# Patient Record
Sex: MALE | Race: WHITE | Employment: FULL TIME | URBAN - METROPOLITAN AREA
[De-identification: names, ages, dates, MRNs, and addresses within clinical notes are randomized per-mention and may not be internally consistent; named-entity substitution may affect disease eponyms.]

---

## 2019-11-16 RX ORDER — LISINOPRIL 20 MG/1
20 TABLET ORAL NIGHTLY
COMMUNITY

## 2019-11-16 RX ORDER — CHOLECALCIFEROL (VITAMIN D3) 50 MCG
TABLET ORAL NIGHTLY
COMMUNITY

## 2019-11-16 RX ORDER — ALBUTEROL SULFATE 90 UG/1
1 AEROSOL, METERED RESPIRATORY (INHALATION) EVERY 6 HOURS PRN
COMMUNITY

## 2019-11-16 RX ORDER — TRAZODONE HYDROCHLORIDE 50 MG/1
50 TABLET ORAL NIGHTLY
COMMUNITY

## 2019-11-16 RX ORDER — FLUTICASONE PROPIONATE 50 MCG
1 SPRAY, SUSPENSION (ML) NASAL NIGHTLY
COMMUNITY

## 2019-11-16 RX ORDER — MELOXICAM 15 MG/1
15 TABLET ORAL NIGHTLY PRN
COMMUNITY

## 2019-11-16 RX ORDER — CYCLOBENZAPRINE HCL 10 MG
10 TABLET ORAL 3 TIMES DAILY PRN
COMMUNITY

## 2019-11-16 RX ORDER — MULTIVITAMIN
TABLET ORAL NIGHTLY
COMMUNITY

## 2019-11-16 RX ORDER — ZOLPIDEM TARTRATE 12.5 MG/1
12.5 TABLET, FILM COATED, EXTENDED RELEASE ORAL NIGHTLY PRN
COMMUNITY

## 2019-11-19 ENCOUNTER — ANESTHESIA EVENT (OUTPATIENT)
Dept: SURGERY | Facility: HOSPITAL | Age: 54
DRG: 454 | End: 2019-11-19
Payer: COMMERCIAL

## 2019-11-20 ENCOUNTER — ANESTHESIA (OUTPATIENT)
Dept: SURGERY | Facility: HOSPITAL | Age: 54
DRG: 454 | End: 2019-11-20
Payer: COMMERCIAL

## 2019-11-20 ENCOUNTER — HOSPITAL ENCOUNTER (INPATIENT)
Facility: HOSPITAL | Age: 54
LOS: 2 days | Discharge: HOME | DRG: 454 | End: 2019-11-22
Attending: NEUROLOGICAL SURGERY | Admitting: NEUROLOGICAL SURGERY
Payer: COMMERCIAL

## 2019-11-20 ENCOUNTER — APPOINTMENT (OUTPATIENT)
Dept: GENERAL RADIOLOGY | Facility: HOSPITAL | Age: 54
DRG: 454 | End: 2019-11-20
Attending: NEUROLOGICAL SURGERY
Payer: COMMERCIAL

## 2019-11-20 PROBLEM — M48.02 CERVICAL STENOSIS OF SPINAL CANAL: Status: ACTIVE | Noted: 2019-11-20

## 2019-11-20 PROCEDURE — 0PB30ZZ EXCISION OF CERVICAL VERTEBRA, OPEN APPROACH: ICD-10-PCS | Performed by: NEUROLOGICAL SURGERY

## 2019-11-20 PROCEDURE — 0RG20AJ FUSION OF 2 OR MORE CERVICAL VERTEBRAL JOINTS WITH INTERBODY FUSION DEVICE, POSTERIOR APPROACH, ANTERIOR COLUMN, OPEN APPROACH: ICD-10-PCS | Performed by: NEUROLOGICAL SURGERY

## 2019-11-20 PROCEDURE — 76000 FLUOROSCOPY <1 HR PHYS/QHP: CPT | Performed by: NEUROLOGICAL SURGERY

## 2019-11-20 PROCEDURE — 0RG2071 FUSION OF 2 OR MORE CERVICAL VERTEBRAL JOINTS WITH AUTOLOGOUS TISSUE SUBSTITUTE, POSTERIOR APPROACH, POSTERIOR COLUMN, OPEN APPROACH: ICD-10-PCS | Performed by: NEUROLOGICAL SURGERY

## 2019-11-20 DEVICE — OSTEOCEL PRO SMALL: Type: IMPLANTABLE DEVICE | Site: SPINE CERVICAL | Status: FUNCTIONAL

## 2019-11-20 DEVICE — THE ALLY BONE SCREW IS A SINGLE-USE IMPLANT MADE OF TITANIUM ALLOY AND USED IN BONE RECONSTRUCTION, OSTEOTOMY, ARTHRODESIS, JOIN FUSION, FRACTURE REPAIR AND FIXATION APPROPRIATE FOR THE SIZE OF THE DEVICE.
Type: IMPLANTABLE DEVICE | Site: SPINE CERVICAL | Status: FUNCTIONAL
Brand: ALLY BONE SCREW

## 2019-11-20 DEVICE — THE DTRAX CERVICAL CAGE-B IS A SINGLE-USE, TITANIUM ALLOY INTERVERTEBRAL IMPLANT AVAILABLE IN VARIOUS FOOTPRINTS AND HEIGHTS. IT IS INTENDED TO BE USED IN CERVICAL SPINAL FUSION SURGERY FOR SKELETALLY MATURE PATIENTS WITH DEGENERATIVE DISC DISEASE OF THE CERVICAL SPINE WITH ACCOMPANYING RADICULAR SYMPTOMS AT ONE DISC LEVEL.
Type: IMPLANTABLE DEVICE | Site: SPINE CERVICAL | Status: FUNCTIONAL
Brand: CAVUX  CERVICAL CAGE-B

## 2019-11-20 RX ORDER — MIDAZOLAM HYDROCHLORIDE 1 MG/ML
INJECTION INTRAMUSCULAR; INTRAVENOUS AS NEEDED
Status: DISCONTINUED | OUTPATIENT
Start: 2019-11-20 | End: 2019-11-20 | Stop reason: SURG

## 2019-11-20 RX ORDER — HYDROCODONE BITARTRATE AND ACETAMINOPHEN 5; 325 MG/1; MG/1
1 TABLET ORAL AS NEEDED
Status: DISCONTINUED | OUTPATIENT
Start: 2019-11-20 | End: 2019-11-20 | Stop reason: HOSPADM

## 2019-11-20 RX ORDER — HYDROMORPHONE HYDROCHLORIDE 1 MG/ML
0.2 INJECTION, SOLUTION INTRAMUSCULAR; INTRAVENOUS; SUBCUTANEOUS EVERY 5 MIN PRN
Status: DISCONTINUED | OUTPATIENT
Start: 2019-11-20 | End: 2019-11-20 | Stop reason: HOSPADM

## 2019-11-20 RX ORDER — FLUTICASONE PROPIONATE 50 MCG
1 SPRAY, SUSPENSION (ML) NASAL NIGHTLY
Status: DISCONTINUED | OUTPATIENT
Start: 2019-11-20 | End: 2019-11-22

## 2019-11-20 RX ORDER — DIPHENHYDRAMINE HCL 25 MG
25 CAPSULE ORAL EVERY 4 HOURS PRN
Status: DISCONTINUED | OUTPATIENT
Start: 2019-11-20 | End: 2019-11-22

## 2019-11-20 RX ORDER — HALOPERIDOL 5 MG/ML
0.25 INJECTION INTRAMUSCULAR ONCE AS NEEDED
Status: DISCONTINUED | OUTPATIENT
Start: 2019-11-20 | End: 2019-11-20 | Stop reason: HOSPADM

## 2019-11-20 RX ORDER — MORPHINE SULFATE 4 MG/ML
4 INJECTION, SOLUTION INTRAMUSCULAR; INTRAVENOUS EVERY 2 HOUR PRN
Status: DISCONTINUED | OUTPATIENT
Start: 2019-11-20 | End: 2019-11-22

## 2019-11-20 RX ORDER — SODIUM CHLORIDE AND POTASSIUM CHLORIDE .9; .15 G/100ML; G/100ML
SOLUTION INTRAVENOUS CONTINUOUS
Status: DISCONTINUED | OUTPATIENT
Start: 2019-11-20 | End: 2019-11-22

## 2019-11-20 RX ORDER — DEXAMETHASONE SODIUM PHOSPHATE 4 MG/ML
VIAL (ML) INJECTION AS NEEDED
Status: DISCONTINUED | OUTPATIENT
Start: 2019-11-20 | End: 2019-11-20 | Stop reason: SURG

## 2019-11-20 RX ORDER — FAMOTIDINE 20 MG/1
20 TABLET ORAL ONCE
Status: COMPLETED | OUTPATIENT
Start: 2019-11-20 | End: 2019-11-20

## 2019-11-20 RX ORDER — HYDROMORPHONE HYDROCHLORIDE 1 MG/ML
0.4 INJECTION, SOLUTION INTRAMUSCULAR; INTRAVENOUS; SUBCUTANEOUS EVERY 5 MIN PRN
Status: DISCONTINUED | OUTPATIENT
Start: 2019-11-20 | End: 2019-11-20 | Stop reason: HOSPADM

## 2019-11-20 RX ORDER — ONDANSETRON 2 MG/ML
4 INJECTION INTRAMUSCULAR; INTRAVENOUS ONCE AS NEEDED
Status: DISCONTINUED | OUTPATIENT
Start: 2019-11-20 | End: 2019-11-20 | Stop reason: HOSPADM

## 2019-11-20 RX ORDER — CEFAZOLIN SODIUM/WATER 2 G/20 ML
2 SYRINGE (ML) INTRAVENOUS EVERY 8 HOURS
Status: DISCONTINUED | OUTPATIENT
Start: 2019-11-20 | End: 2019-11-22

## 2019-11-20 RX ORDER — MORPHINE SULFATE 10 MG/ML
6 INJECTION, SOLUTION INTRAMUSCULAR; INTRAVENOUS EVERY 10 MIN PRN
Status: DISCONTINUED | OUTPATIENT
Start: 2019-11-20 | End: 2019-11-20 | Stop reason: HOSPADM

## 2019-11-20 RX ORDER — SODIUM CHLORIDE, SODIUM LACTATE, POTASSIUM CHLORIDE, CALCIUM CHLORIDE 600; 310; 30; 20 MG/100ML; MG/100ML; MG/100ML; MG/100ML
INJECTION, SOLUTION INTRAVENOUS CONTINUOUS
Status: DISCONTINUED | OUTPATIENT
Start: 2019-11-20 | End: 2019-11-20 | Stop reason: ALTCHOICE

## 2019-11-20 RX ORDER — ACETAMINOPHEN 325 MG/1
650 TABLET ORAL EVERY 4 HOURS PRN
Status: DISCONTINUED | OUTPATIENT
Start: 2019-11-20 | End: 2019-11-22

## 2019-11-20 RX ORDER — DIAPER,BRIEF,INFANT-TODD,DISP
EACH MISCELLANEOUS AS NEEDED
Status: DISCONTINUED | OUTPATIENT
Start: 2019-11-20 | End: 2019-11-20 | Stop reason: HOSPADM

## 2019-11-20 RX ORDER — MORPHINE SULFATE 4 MG/ML
2 INJECTION, SOLUTION INTRAMUSCULAR; INTRAVENOUS EVERY 10 MIN PRN
Status: DISCONTINUED | OUTPATIENT
Start: 2019-11-20 | End: 2019-11-20 | Stop reason: HOSPADM

## 2019-11-20 RX ORDER — MORPHINE SULFATE 4 MG/ML
4 INJECTION, SOLUTION INTRAMUSCULAR; INTRAVENOUS EVERY 10 MIN PRN
Status: DISCONTINUED | OUTPATIENT
Start: 2019-11-20 | End: 2019-11-20 | Stop reason: HOSPADM

## 2019-11-20 RX ORDER — ALBUTEROL SULFATE 90 UG/1
1 AEROSOL, METERED RESPIRATORY (INHALATION) EVERY 6 HOURS PRN
Status: DISCONTINUED | OUTPATIENT
Start: 2019-11-20 | End: 2019-11-22

## 2019-11-20 RX ORDER — ACETAMINOPHEN 500 MG
1000 TABLET ORAL ONCE
Status: COMPLETED | OUTPATIENT
Start: 2019-11-20 | End: 2019-11-20

## 2019-11-20 RX ORDER — ROCURONIUM BROMIDE 10 MG/ML
INJECTION, SOLUTION INTRAVENOUS AS NEEDED
Status: DISCONTINUED | OUTPATIENT
Start: 2019-11-20 | End: 2019-11-20 | Stop reason: SURG

## 2019-11-20 RX ORDER — PROCHLORPERAZINE EDISYLATE 5 MG/ML
5 INJECTION INTRAMUSCULAR; INTRAVENOUS ONCE AS NEEDED
Status: DISCONTINUED | OUTPATIENT
Start: 2019-11-20 | End: 2019-11-20 | Stop reason: HOSPADM

## 2019-11-20 RX ORDER — METHOCARBAMOL 750 MG/1
750 TABLET, FILM COATED ORAL 3 TIMES DAILY PRN
Status: DISCONTINUED | OUTPATIENT
Start: 2019-11-20 | End: 2019-11-22

## 2019-11-20 RX ORDER — ONDANSETRON 2 MG/ML
4 INJECTION INTRAMUSCULAR; INTRAVENOUS EVERY 4 HOURS PRN
Status: ACTIVE | OUTPATIENT
Start: 2019-11-20 | End: 2019-11-21

## 2019-11-20 RX ORDER — ZOLPIDEM TARTRATE 10 MG/1
10 TABLET ORAL NIGHTLY PRN
Status: DISCONTINUED | OUTPATIENT
Start: 2019-11-20 | End: 2019-11-22

## 2019-11-20 RX ORDER — HYDROCODONE BITARTRATE AND ACETAMINOPHEN 10; 325 MG/1; MG/1
2 TABLET ORAL EVERY 4 HOURS PRN
Status: DISCONTINUED | OUTPATIENT
Start: 2019-11-20 | End: 2019-11-22

## 2019-11-20 RX ORDER — MORPHINE SULFATE 2 MG/ML
2 INJECTION, SOLUTION INTRAMUSCULAR; INTRAVENOUS EVERY 2 HOUR PRN
Status: DISCONTINUED | OUTPATIENT
Start: 2019-11-20 | End: 2019-11-22

## 2019-11-20 RX ORDER — BUPIVACAINE HYDROCHLORIDE 2.5 MG/ML
INJECTION, SOLUTION EPIDURAL; INFILTRATION; INTRACAUDAL AS NEEDED
Status: DISCONTINUED | OUTPATIENT
Start: 2019-11-20 | End: 2019-11-20 | Stop reason: HOSPADM

## 2019-11-20 RX ORDER — HYDROCODONE BITARTRATE AND ACETAMINOPHEN 5; 325 MG/1; MG/1
2 TABLET ORAL AS NEEDED
Status: DISCONTINUED | OUTPATIENT
Start: 2019-11-20 | End: 2019-11-20 | Stop reason: HOSPADM

## 2019-11-20 RX ORDER — TRAZODONE HYDROCHLORIDE 50 MG/1
50 TABLET ORAL NIGHTLY
Status: DISCONTINUED | OUTPATIENT
Start: 2019-11-20 | End: 2019-11-22

## 2019-11-20 RX ORDER — SENNOSIDES 8.6 MG
17.2 TABLET ORAL NIGHTLY
Status: DISCONTINUED | OUTPATIENT
Start: 2019-11-20 | End: 2019-11-22

## 2019-11-20 RX ORDER — METOCLOPRAMIDE 10 MG/1
10 TABLET ORAL ONCE
Status: COMPLETED | OUTPATIENT
Start: 2019-11-20 | End: 2019-11-20

## 2019-11-20 RX ORDER — CEFAZOLIN SODIUM/WATER 2 G/20 ML
2 SYRINGE (ML) INTRAVENOUS ONCE
Status: COMPLETED | OUTPATIENT
Start: 2019-11-20 | End: 2019-11-20

## 2019-11-20 RX ORDER — LISINOPRIL 20 MG/1
20 TABLET ORAL NIGHTLY
Status: DISCONTINUED | OUTPATIENT
Start: 2019-11-20 | End: 2019-11-22

## 2019-11-20 RX ORDER — SODIUM CHLORIDE 9 MG/ML
INJECTION, SOLUTION INTRAVENOUS CONTINUOUS PRN
Status: DISCONTINUED | OUTPATIENT
Start: 2019-11-20 | End: 2019-11-20 | Stop reason: SURG

## 2019-11-20 RX ORDER — NALOXONE HYDROCHLORIDE 0.4 MG/ML
80 INJECTION, SOLUTION INTRAMUSCULAR; INTRAVENOUS; SUBCUTANEOUS AS NEEDED
Status: DISCONTINUED | OUTPATIENT
Start: 2019-11-20 | End: 2019-11-20 | Stop reason: HOSPADM

## 2019-11-20 RX ORDER — SODIUM CHLORIDE, SODIUM LACTATE, POTASSIUM CHLORIDE, CALCIUM CHLORIDE 600; 310; 30; 20 MG/100ML; MG/100ML; MG/100ML; MG/100ML
INJECTION, SOLUTION INTRAVENOUS CONTINUOUS
Status: DISCONTINUED | OUTPATIENT
Start: 2019-11-20 | End: 2019-11-20 | Stop reason: HOSPADM

## 2019-11-20 RX ORDER — EPHEDRINE SULFATE 50 MG/ML
INJECTION, SOLUTION INTRAVENOUS AS NEEDED
Status: DISCONTINUED | OUTPATIENT
Start: 2019-11-20 | End: 2019-11-20 | Stop reason: SURG

## 2019-11-20 RX ORDER — MORPHINE SULFATE 2 MG/ML
1 INJECTION, SOLUTION INTRAMUSCULAR; INTRAVENOUS EVERY 2 HOUR PRN
Status: DISCONTINUED | OUTPATIENT
Start: 2019-11-20 | End: 2019-11-22

## 2019-11-20 RX ORDER — HYDROMORPHONE HYDROCHLORIDE 1 MG/ML
0.6 INJECTION, SOLUTION INTRAMUSCULAR; INTRAVENOUS; SUBCUTANEOUS EVERY 5 MIN PRN
Status: DISCONTINUED | OUTPATIENT
Start: 2019-11-20 | End: 2019-11-20 | Stop reason: HOSPADM

## 2019-11-20 RX ORDER — HYDROCODONE BITARTRATE AND ACETAMINOPHEN 10; 325 MG/1; MG/1
1 TABLET ORAL EVERY 4 HOURS PRN
Status: DISCONTINUED | OUTPATIENT
Start: 2019-11-20 | End: 2019-11-22

## 2019-11-20 RX ORDER — DIPHENHYDRAMINE HYDROCHLORIDE 50 MG/ML
25 INJECTION INTRAMUSCULAR; INTRAVENOUS EVERY 4 HOURS PRN
Status: DISCONTINUED | OUTPATIENT
Start: 2019-11-20 | End: 2019-11-22

## 2019-11-20 RX ORDER — ONDANSETRON 2 MG/ML
INJECTION INTRAMUSCULAR; INTRAVENOUS AS NEEDED
Status: DISCONTINUED | OUTPATIENT
Start: 2019-11-20 | End: 2019-11-20 | Stop reason: SURG

## 2019-11-20 RX ADMIN — SODIUM CHLORIDE, SODIUM LACTATE, POTASSIUM CHLORIDE, CALCIUM CHLORIDE: 600; 310; 30; 20 INJECTION, SOLUTION INTRAVENOUS at 09:38:00

## 2019-11-20 RX ADMIN — ROCURONIUM BROMIDE 20 MG: 10 INJECTION, SOLUTION INTRAVENOUS at 07:38:00

## 2019-11-20 RX ADMIN — MIDAZOLAM HYDROCHLORIDE 2 MG: 1 INJECTION INTRAMUSCULAR; INTRAVENOUS at 07:38:00

## 2019-11-20 RX ADMIN — SODIUM CHLORIDE: 9 INJECTION, SOLUTION INTRAVENOUS at 07:46:00

## 2019-11-20 RX ADMIN — ONDANSETRON 4 MG: 2 INJECTION INTRAMUSCULAR; INTRAVENOUS at 09:38:00

## 2019-11-20 RX ADMIN — DEXAMETHASONE SODIUM PHOSPHATE 4 MG: 4 MG/ML VIAL (ML) INJECTION at 09:38:00

## 2019-11-20 RX ADMIN — EPHEDRINE SULFATE 10 MG: 50 INJECTION, SOLUTION INTRAVENOUS at 08:19:00

## 2019-11-20 RX ADMIN — CEFAZOLIN SODIUM/WATER 2 G: 2 G/20 ML SYRINGE (ML) INTRAVENOUS at 07:49:00

## 2019-11-20 RX ADMIN — SODIUM CHLORIDE: 9 INJECTION, SOLUTION INTRAVENOUS at 09:38:00

## 2019-11-20 NOTE — ANESTHESIA PREPROCEDURE EVALUATION
Anesthesia PreOp Note    HPI:     Evelyn Sanchez is a 48year old male who presents for preoperative consultation requested by: Yared Braden MD    Date of Surgery: 11/20/2019    Procedure(s):  POSTERIOR CERVICAL FUSION BG 2 LEVEL  Indication: cervical spina 11/18/2019  Fluticasone Propionate 50 MCG/ACT Nasal Suspension, 1 spray by Each Nare route nightly., Disp: , Rfl: , 11/18/2019  Cholecalciferol (VITAMIN D) 50 MCG (2000 UT) Oral Tab, Take by mouth nightly., Disp: , Rfl: , 11/18/2019  Coenzyme Q10 (COQ10 OR children: Not on file      Years of education: Not on file      Highest education level: Not on file    Occupational History      Not on file    Social Needs      Financial resource strain: Not on file      Food insecurity:        Worry: Not on file History of anesthetic complications (Difficult intubation- pt has no recollection of this)   Airway   Mallampati: II  TM distance: <3 FB  Neck ROM: full  Dental      Pulmonary - normal exam   (+) asthma, sleep apnea on CPAP,   Cardiovascular - normal ex

## 2019-11-20 NOTE — ANESTHESIA PROCEDURE NOTES
Peripheral IV  Inserted by: Myles Patel MD    Placement  Needle size: 20 G  Laterality: left  Location: hand  Local anesthetic: none  Site prep: alcohol  Technique: anatomical landmarks  Attempts: 1

## 2019-11-20 NOTE — OPERATIVE REPORT
[facility]    PATIENT'S NAME: Sharonda Jayde   ATTENDING PHYSICIAN: Mary Lou Vaughn MD   OPERATING PHYSICIAN: Mary Lou Vaughn MD   PATIENT ACCOUNT#:   [id number]    LOCATION:  [facility] [address] PACU [address] [place] [address]  MEDICAL RECORD #:   [id number]       DATE OF BIRTH:  2 proceeded to the operating room on the November 20, 2019. OPERATIVE TECHNIQUE:  The patient was brought to the operating room, put to sleep, intubated. Electrophysiologic monitoring was initiated after electrodes were applied.   Baseline potentials wer then removed the laminae en bloc with a Grazyna Boehringer. The medial facets were resected with a Kerrison 2 medial to lateral, and we followed the nerves to the exiting foramina bilaterally.   Hemostasis was obtained in the epidural area with Floseal and cotton silvana

## 2019-11-20 NOTE — ANESTHESIA POSTPROCEDURE EVALUATION
Patient: Solo Steel    Procedure Summary     Date:  11/20/19 Room / Location:  [address] [place] [address] MAIN OR 09 / 300 [facility] MAIN OR    Anesthesia Start:  4498 Anesthesia Stop:  1001    Procedure:  POSTERIOR CERVICAL FUSION BG 2 LEVEL (N/A Spine Cervical) Diagnosis:  (cervical

## 2019-11-20 NOTE — PLAN OF CARE
Problem: Patient/Family Goals  Goal: Patient/Family Long Term Goal  Description  Patient's Long Term Goal: To return home    Interventions:  - Discharge planning  - Pain management  - Ambulation  - See additional Care Plan goals for specific intervention neutropenic period  Description  INTERVENTIONS  - Monitor WBC  - Administer growth factors as ordered  - Implement neutropenic guidelines  Outcome: Progressing     Problem: SAFETY ADULT - FALL  Goal: Free from fall injury  Description  INTERVENTIONS:  - As light in reach. Will continue to monitor.

## 2019-11-20 NOTE — BRIEF OP NOTE
[facility] BRIEF OPERATIVE NOTE     Awanda Pair Location: OR   [facility] [id number] MRN [id number]   Admission Date 11/20/2019 Operation Date 11/20/2019     Operating Physician Hernandez Disla MD        Preoperative Diagnosis:

## 2019-11-20 NOTE — PLAN OF CARE
NEUROLOGICAL SURGERY & SPINE SURGERY      11/20/2019  6:48 AM     PRE-OPERATIVE CONSULTATION    Solo Steel was again informed of the nature of the problem, planned treatment, indications and alternatives.   We again reviewed the expected benef

## 2019-11-20 NOTE — ANESTHESIA PROCEDURE NOTES
Airway  Urgency: Elective      General Information and Staff    Patient location during procedure: OR  Anesthesiologist: Candi Ansari MD  Performed: anesthesiologist     Indications and Patient Condition  Indications for airway management: anesthesia  Sp

## 2019-11-21 NOTE — H&P
[facility]    History and Physical    Juanki Odell Patient Status:  Inpatient    3/24/1965 MRN [id number]   Location [facility] 4W/SW/SE Attending Piedad León MD   Hosp Day # 0 PCP No primary care provider on file.      Date: mg by mouth nightly as needed for Pain. Cyclobenzaprine HCl 10 MG Oral Tab, Take 10 mg by mouth 3 (three) times daily as needed for Muscle spasms. Fluticasone Propionate 50 MCG/ACT Nasal Suspension, 1 spray by Each Nare route nightly.   Cholecalciferol (V Dictated by (CST): Tommie Villarreal MD on 11/20/2019 at 10:02     Approved by (CST): Mahsa Ham MD on 11/20/2019 at 10:02                Assessment/Plan:     Cervical stenosis of spinal canal  Status post fusion, PT, OT, IV pain cont

## 2019-11-21 NOTE — PHYSICAL THERAPY NOTE
PHYSICAL THERAPY EVALUATION - INPATIENT     Room Number: 406/406-A  Evaluation Date: 11/21/2019  Type of Evaluation: Initial   Physician Order: PT Eval and Treat    Presenting Problem: s/p C4-6 laminectomy, PSF  Reason for Therapy: Mobility Dysfunction an and ambulation to supervision and SBA respectively. Pt ambulated 300' x 1, 100' x 1 at SBA, demonstrated mild shuffled gait, mild path deviations, improved obstacle navigation and balance with minimal verbal cues.  Initiated stair training, pt ascended/ Cervical fusion 1999   • Calculus of kidney    • Difficult intubation     Hx of Cervical fx  Fusion Level 4-5 plate 7340    • High blood pressure    • Sleep apnea     CPAP       Past Surgical History  Past Surgical History:   Procedure Laterality Date SHORT FORM - BASIC MOBILITY  How much difficulty does the patient currently have. ..  -   Turning over in bed (including adjusting bedclothes, sheets and blankets)?: A Little   -   Sitting down on and standing up from a chair with arms (e.g., wheelchair, be Patient is able to demonstrate supine - sit EOB @ level: modified independent     Goal #1   Current Status Pt performs bed mobility via log roll at SBA   Goal #2 Patient is able to demonstrate transfers Sit to/from Stand at assistance level: modified indep

## 2019-11-21 NOTE — PROGRESS NOTES
[facility] [facility]    Progress Note    Evergreen Rain Patient Status:  Inpatient    3/24/1965 MRN [id number]   Location [facility] 4W/SW/SE Attending Wilbert Patel MD   Hosp Day # 1 PCP No primary care provider on file.         Subjecti

## 2019-11-21 NOTE — PLAN OF CARE
Problem: Patient/Family Goals  Goal: Patient/Family Long Term Goal  Description  Patient's Long Term Goal: To return home    Interventions:  - Discharge planning  - Pain management  - Ambulation  - See additional Care Plan goals for specific intervention neutropenic period  Description  INTERVENTIONS  - Monitor WBC  - Administer growth factors as ordered  - Implement neutropenic guidelines  Outcome: Progressing     Problem: SAFETY ADULT - FALL  Goal: Free from fall injury  Description  INTERVENTIONS:  - As discharged.

## 2019-11-21 NOTE — PROGRESS NOTES
[place] [facility] - [facility]    Neurosurgery Progress Note    Toan Blake Patient Status:  Inpatient    3/24/1965 MRN [id number]   Location [facility] 4W/SW/SE Attending Milan Lino MD   Hosp Day # 1 PCP No primary care provider on file. Inhalation, Q6H PRN  •  Fluticasone Propionate (FLONASE) 50 MCG/ACT nasal spray 1 spray, 1 spray, Each Nare, Nightly  •  lisinopril tab 20 mg, 20 mg, Oral, Nightly  •  Fluticasone Furoate (ARNUITY ELLIPTA) 200 MCG/ACT inhaler 1 puff, 1 puff, Inhalation, Da

## 2019-11-21 NOTE — OCCUPATIONAL THERAPY NOTE
OCCUPATIONAL THERAPY EVALUATION - INPATIENT     Room Number: 406/406-A  Evaluation Date: 11/21/2019  Type of Evaluation: Quick Eval  Presenting Problem: (cervical stenosis of spine canal )    Physician Order: IP Consult to Occupational Therapy  Reason fo limitation presents during this admission.     OCCUPATIONAL THERAPY MEDICAL/SOCIAL HISTORY     Problem List  Active Problems:    Cervical stenosis of spinal canal      Past Medical History  Past Medical History:   Diagnosis Date   • Anesthesia complication expressive language intact    Behavioral/Emotional/Social:  Patient was pleasant and cooperative    RANGE OF MOTION   Upper extremity ROM is within functional limits     STRENGTH ASSESSMENT  Upper extremity strength is within functional limits     NEUROLOG

## 2019-11-22 VITALS
SYSTOLIC BLOOD PRESSURE: 145 MMHG | DIASTOLIC BLOOD PRESSURE: 92 MMHG | RESPIRATION RATE: 20 BRPM | WEIGHT: 242 LBS | TEMPERATURE: 99 F | OXYGEN SATURATION: 96 % | BODY MASS INDEX: 35.84 KG/M2 | HEART RATE: 80 BPM | HEIGHT: 69 IN

## 2019-11-22 PROCEDURE — 99238 HOSP IP/OBS DSCHRG MGMT 30/<: CPT | Performed by: INTERNAL MEDICINE

## 2019-11-22 RX ORDER — METHOCARBAMOL 750 MG/1
750 TABLET, FILM COATED ORAL 3 TIMES DAILY PRN
Refills: 0 | Status: SHIPPED | COMMUNITY
Start: 2019-11-22

## 2019-11-22 RX ORDER — HYDROCODONE BITARTRATE AND ACETAMINOPHEN 10; 325 MG/1; MG/1
1-2 TABLET ORAL EVERY 6 HOURS PRN
Refills: 0 | Status: SHIPPED | COMMUNITY
Start: 2019-11-22

## 2019-11-22 NOTE — PHYSICAL THERAPY NOTE
PHYSICAL THERAPY TREATMENT NOTE - INPATIENT     Room Number: 406/406-A       Presenting Problem: s/p C4-6 laminectomy, PSF    Problem List  Active Problems:    Cervical stenosis of spinal canal      PHYSICAL THERAPY ASSESSMENT   Pt was seen for follow-up P MOBILITY  How much difficulty does the patient currently have. ..  -   Turning over in bed (including adjusting bedclothes, sheets and blankets)?: None   -   Sitting down on and standing up from a chair with arms (e.g., wheelchair, bedside commode, etc.): N home activity/exercise instructions provided to patient in preparation for discharge.    Goal #5   Current Status Goal Met   Goal #6 Pt able to recall and maintain all spine precautions to ensure safety and proper healing    Goal #6  Current Status Goal Met

## 2019-11-22 NOTE — PLAN OF CARE
Problem: DISCHARGE PLANNING  Goal: Discharge to home or other facility with appropriate resources  Description  INTERVENTIONS:  - Identify barriers to discharge w/pt and caregiver  - Include patient/family/discharge partner in discharge Rudi Waggoner

## 2019-11-22 NOTE — DISCHARGE SUMMARY
[place] [facility] - [facility]    Discharge Summary    Nallely Carrion Patient Status:  Inpatient    3/24/1965 MRN [id number]   Location [facility] 4W/SW/SE Attending Calixto Marcus MD   Hosp Day # 2 PCP No primary care provider on file.      Date of A FUSION BG 2 LEVEL David London  [facility] MAIN OR Comp            Discharge Plan:   Discharge Condition: Stable    Current Discharge Medication List    Home Meds - Unchanged    Albuterol Sulfate  (90 Base) MCG/ACT Inhalation Aero Soln  Inhale 1 puff int scripts are in the chart. Please take prescriptions to your pharmacy. Take medications as directed. Continue icing of the area of the incision for 30 minutes at a time, multiple   times a day.    If a cooling device (e.g. Polar Care) has been provided

## 2019-11-22 NOTE — PROGRESS NOTES
[place] [facility] - [facility]    NEUROSURGERY PROGRESS NOTE    Vishal Adame Patient Status:  Inpatient    3/24/1965 MRN [id number]   Location [facility] 4W/SW/SE Attending Janet Greenwood MD   Hosp Day # 2 PCP No primary care provider on file. FREDERICK BELCHER EarlimartKATARZYNA   11/22/2019   8:05 AM

## 2019-11-22 NOTE — PLAN OF CARE
VSS, no acute events over shift. Patient resting in bed, CPAP in place (self managed). Bed in lowest position, call light in reach. Plan to continue to monitor, administer medications as ordered, follow plan of care.     Problem: Patient/Family Goals  Goal: interventions unsuccessful or patient reports new pain  - Anticipate increased pain with activity and pre-medicate as appropriate  Outcome: Progressing     Problem: RISK FOR INFECTION - ADULT  Goal: Absence of fever/infection during anticipated neutropenic

## (undated) DEVICE — TRAY FOLEY BDX 16F STATLOCK

## (undated) DEVICE — 3M™ TEGADERM™ TRANSPARENT FILM DRESSING, 1626W, 4 IN X 4-3/4 IN (10 CM X 12 CM), 50 EACH/CARTON, 4 CARTON/CASE: Brand: 3M™ TEGADERM™

## (undated) DEVICE — SOL  .9 1000ML BAG

## (undated) DEVICE — BIPOLAR SEALER 23-112-1 AQM 6.0: Brand: AQUAMANTYS™

## (undated) DEVICE — GOWN SURG AERO BLUE PERF LG

## (undated) DEVICE — DRAPE SHEET LG

## (undated) DEVICE — MAYFIELD® DISPOSABLE ADULT SKULL PIN (STAINLESS STEEL): Brand: MAYFIELD®

## (undated) DEVICE — DRESSING BIOPATCH 1X4 CNTR

## (undated) DEVICE — CHLORAPREP 26ML APPLICATOR

## (undated) DEVICE — PROXIMATE SKIN STAPLERS (35 WIDE) CONTAINS 35 STAINLESS STEEL STAPLES (FIXED HEAD): Brand: PROXIMATE

## (undated) DEVICE — SUTURE MONOCRYL 4-0 PS-2

## (undated) DEVICE — LAMINECTOMY: Brand: MEDLINE INDUSTRIES, INC.

## (undated) DEVICE — C-ARMOR C-ARM EQUIPMENT COVERS CLEAR STERILE UNIVERSAL FIT 12 PER CASE: Brand: C-ARMOR

## (undated) DEVICE — THE DTRAX® SPINAL SYSTEM IS A SET OF INSTRUMENTS INTENDED AND INDICATED FOR ACCESS AND PREPARATION OF A SPINAL JOINT TO AID IN FUSION.: Brand: DTRAX® SPINAL SYSTEM

## (undated) DEVICE — HEX-LOCKING BLADE ELECTRODE: Brand: EDGE

## (undated) DEVICE — 3.0MM NEURO (MATCH HEAD) SOFT TOUCH

## (undated) DEVICE — CLIPPER BLADE 3M

## (undated) DEVICE — FLOSEAL HEMOSTATIC MATRIX, 5ML: Brand: FLOSEAL HEMOSTATIC MATRIX

## (undated) DEVICE — KIT DRN 1/8IN PVC 3 SPRG EVAC

## (undated) DEVICE — GAMMEX® PI HYBRID SIZE 7.5, STERILE POWDER-FREE SURGICAL GLOVE, POLYISOPRENE AND NEOPRENE BLEND: Brand: GAMMEX

## (undated) DEVICE — DERMABOND LIQUID ADHESIVE

## (undated) DEVICE — BAG SRG CLR 36X30IN

## (undated) DEVICE — PEN: MARKING STD PT 100/CS: Brand: MEDICAL ACTION INDUSTRIES

## (undated) DEVICE — SUTURE VICRYL 0 CT-1

## (undated) DEVICE — NVM5 MULTIMODALITY SURFACE KIT

## (undated) DEVICE — SUTURE VICRYL 3-0 J761D

## (undated) DEVICE — SOL  .9 1000ML BTL

## (undated) DEVICE — 9534HP TRANSPARENT DRSG W/FRAME: Brand: 3M™ TEGADERM™